# Patient Record
Sex: FEMALE | Race: BLACK OR AFRICAN AMERICAN | NOT HISPANIC OR LATINO
[De-identification: names, ages, dates, MRNs, and addresses within clinical notes are randomized per-mention and may not be internally consistent; named-entity substitution may affect disease eponyms.]

---

## 2018-12-05 ENCOUNTER — APPOINTMENT (OUTPATIENT)
Dept: RADIOLOGY | Facility: CLINIC | Age: 48
End: 2018-12-05
Payer: COMMERCIAL

## 2018-12-05 ENCOUNTER — OUTPATIENT (OUTPATIENT)
Dept: OUTPATIENT SERVICES | Facility: HOSPITAL | Age: 48
LOS: 1 days | End: 2018-12-05
Payer: COMMERCIAL

## 2018-12-05 DIAGNOSIS — Z00.8 ENCOUNTER FOR OTHER GENERAL EXAMINATION: ICD-10-CM

## 2018-12-05 PROCEDURE — 71046 X-RAY EXAM CHEST 2 VIEWS: CPT | Mod: 26

## 2018-12-05 PROCEDURE — 71046 X-RAY EXAM CHEST 2 VIEWS: CPT

## 2022-01-21 ENCOUNTER — APPOINTMENT (OUTPATIENT)
Dept: CT IMAGING | Facility: IMAGING CENTER | Age: 52
End: 2022-01-21

## 2023-11-06 ENCOUNTER — OFFICE (OUTPATIENT)
Facility: LOCATION | Age: 53
Setting detail: OPHTHALMOLOGY
End: 2023-11-06
Payer: COMMERCIAL

## 2023-11-06 DIAGNOSIS — H40.013: ICD-10-CM

## 2023-11-06 DIAGNOSIS — E11.9: ICD-10-CM

## 2023-11-06 DIAGNOSIS — H25.13: ICD-10-CM

## 2023-11-06 PROCEDURE — 92014 COMPRE OPH EXAM EST PT 1/>: CPT | Performed by: OPHTHALMOLOGY

## 2023-11-06 PROCEDURE — 92250 FUNDUS PHOTOGRAPHY W/I&R: CPT | Performed by: OPHTHALMOLOGY

## 2023-11-06 ASSESSMENT — REFRACTION_AUTOREFRACTION
OD_SPHERE: +0.25
OD_CYLINDER: -0.75
OS_CYLINDER: -0.50
OS_AXIS: 081
OS_SPHERE: +0.25
OD_AXIS: 082

## 2023-11-06 ASSESSMENT — SPHEQUIV_DERIVED
OD_SPHEQUIV: -0.125
OS_SPHEQUIV: 0

## 2023-11-06 ASSESSMENT — CONFRONTATIONAL VISUAL FIELD TEST (CVF)
OD_FINDINGS: FULL
OS_FINDINGS: FULL

## 2023-12-12 ENCOUNTER — OFFICE (OUTPATIENT)
Facility: LOCATION | Age: 53
Setting detail: OPHTHALMOLOGY
End: 2023-12-12
Payer: COMMERCIAL

## 2023-12-12 DIAGNOSIS — H40.013: ICD-10-CM

## 2023-12-12 DIAGNOSIS — E11.9: ICD-10-CM

## 2023-12-12 DIAGNOSIS — H25.13: ICD-10-CM

## 2023-12-12 PROCEDURE — 76514 ECHO EXAM OF EYE THICKNESS: CPT | Performed by: OPHTHALMOLOGY

## 2023-12-12 PROCEDURE — 92133 CPTRZD OPH DX IMG PST SGM ON: CPT | Performed by: OPHTHALMOLOGY

## 2023-12-12 PROCEDURE — 99213 OFFICE O/P EST LOW 20 MIN: CPT | Performed by: OPHTHALMOLOGY

## 2023-12-12 PROCEDURE — 92083 EXTENDED VISUAL FIELD XM: CPT | Performed by: OPHTHALMOLOGY

## 2023-12-12 ASSESSMENT — REFRACTION_AUTOREFRACTION
OD_AXIS: 098
OS_SPHERE: +0.50
OS_AXIS: 081
OD_SPHERE: +0.50
OS_CYLINDER: -0.75
OD_CYLINDER: -0.50

## 2023-12-12 ASSESSMENT — SPHEQUIV_DERIVED
OD_SPHEQUIV: 0.25
OS_SPHEQUIV: 0.125

## 2023-12-12 ASSESSMENT — CONFRONTATIONAL VISUAL FIELD TEST (CVF)
OD_FINDINGS: FULL
OS_FINDINGS: FULL

## 2025-06-16 ENCOUNTER — APPOINTMENT (OUTPATIENT)
Dept: SURGERY | Facility: CLINIC | Age: 55
End: 2025-06-16

## 2025-07-08 ENCOUNTER — APPOINTMENT (OUTPATIENT)
Dept: ORTHOPEDIC SURGERY | Facility: CLINIC | Age: 55
End: 2025-07-08
Payer: COMMERCIAL

## 2025-07-08 VITALS — HEIGHT: 69 IN | BODY MASS INDEX: 25.92 KG/M2 | WEIGHT: 175 LBS

## 2025-07-08 PROBLEM — E11.9 DIABETES: Status: RESOLVED | Noted: 2025-07-08 | Resolved: 2025-07-08

## 2025-07-08 PROBLEM — M17.12 ARTHRITIS OF KNEE, LEFT: Status: ACTIVE | Noted: 2025-07-08

## 2025-07-08 PROBLEM — Z96.651 STATUS POST RIGHT KNEE REPLACEMENT: Status: ACTIVE | Noted: 2025-07-08

## 2025-07-08 PROCEDURE — 73562 X-RAY EXAM OF KNEE 3: CPT | Mod: LT

## 2025-07-08 PROCEDURE — 99204 OFFICE O/P NEW MOD 45 MIN: CPT

## 2025-07-08 RX ORDER — MELOXICAM 15 MG/1
15 TABLET ORAL
Qty: 30 | Refills: 1 | Status: ACTIVE | COMMUNITY
Start: 2025-07-08 | End: 1900-01-01

## 2025-07-09 ENCOUNTER — EMERGENCY (EMERGENCY)
Facility: HOSPITAL | Age: 55
LOS: 0 days | Discharge: HOME | End: 2025-07-09
Payer: COMMERCIAL

## 2025-07-09 VITALS
DIASTOLIC BLOOD PRESSURE: 71 MMHG | HEIGHT: 69 IN | OXYGEN SATURATION: 100 % | TEMPERATURE: 98 F | SYSTOLIC BLOOD PRESSURE: 125 MMHG | WEIGHT: 175.05 LBS | RESPIRATION RATE: 18 BRPM | HEART RATE: 89 BPM

## 2025-07-09 VITALS
SYSTOLIC BLOOD PRESSURE: 113 MMHG | OXYGEN SATURATION: 99 % | RESPIRATION RATE: 17 BRPM | HEART RATE: 66 BPM | TEMPERATURE: 98 F | DIASTOLIC BLOOD PRESSURE: 72 MMHG

## 2025-07-09 DIAGNOSIS — M25.562 PAIN IN LEFT KNEE: ICD-10-CM

## 2025-07-09 DIAGNOSIS — G89.29 OTHER CHRONIC PAIN: ICD-10-CM

## 2025-07-09 DIAGNOSIS — E11.9 TYPE 2 DIABETES MELLITUS WITHOUT COMPLICATIONS: ICD-10-CM

## 2025-07-09 DIAGNOSIS — M25.561 PAIN IN RIGHT KNEE: ICD-10-CM

## 2025-07-09 DIAGNOSIS — Z96.651 PRESENCE OF RIGHT ARTIFICIAL KNEE JOINT: ICD-10-CM

## 2025-07-09 PROCEDURE — 99284 EMERGENCY DEPT VISIT MOD MDM: CPT

## 2025-07-09 RX ORDER — ACETAMINOPHEN 500 MG/5ML
650 LIQUID (ML) ORAL ONCE
Refills: 0 | Status: COMPLETED | OUTPATIENT
Start: 2025-07-09 | End: 2025-07-09

## 2025-07-09 RX ORDER — KETOROLAC TROMETHAMINE 30 MG/ML
30 INJECTION, SOLUTION INTRAMUSCULAR; INTRAVENOUS ONCE
Refills: 0 | Status: DISCONTINUED | OUTPATIENT
Start: 2025-07-09 | End: 2025-07-09

## 2025-07-09 RX ADMIN — Medication 650 MILLIGRAM(S): at 17:47

## 2025-07-09 RX ADMIN — KETOROLAC TROMETHAMINE 30 MILLIGRAM(S): 30 INJECTION, SOLUTION INTRAMUSCULAR; INTRAVENOUS at 17:47

## 2025-07-09 RX ADMIN — KETOROLAC TROMETHAMINE 30 MILLIGRAM(S): 30 INJECTION, SOLUTION INTRAMUSCULAR; INTRAVENOUS at 15:08

## 2025-07-09 RX ADMIN — Medication 650 MILLIGRAM(S): at 15:08

## 2025-07-09 NOTE — ED PROVIDER NOTE - CLINICAL SUMMARY MEDICAL DECISION MAKING FREE TEXT BOX
55F w/ PMH DM2, Chronic Knee Pain, s/p RT TKR who presents to ED w/ bilateral knee pain x months. Pt reporting history of chronic knee pain, gradually worsening over the past few months, pt states w/ worsening left knee pain s/p transferring a patient at work, pt went to outside clinic for XRAY's which showed Arthritic changes but no acute fractures, pt w/ scheduled Ortho appt tomorrow. Pt presenting to ED today for persistent knee pain. No recent injury/trauma or falls. Denies fever, CP, SOB, abd pain, extremity weakness/numbness/tingling, skin erythema/swelling/warmth, dizziness, or headaches.     On PE - Patient currently afebrile, hemodynamically stable, spO2 100%.   General: Pt awake, alert, well-appearing, in no acute distress.  HEENT: Head atraumatic, normocephalic. Airway patent.  Cardio: Normal rate, regular rhythm.  Heart sounds S1, S2.   Lungs: Chest symmetrical, non-labored breathing, lungs clear bilaterally.  Abdomen: Abdomen soft/non-distended/non-tender to palpation.   Neuro: Alert and oriented, no focal deficits, no motor or sensory deficits.  MSK: Moving all extremities equally. Full ROM of the knees b/l, no obvious skin erythema/swelling/warmth, b/l knees non-tender w/ palpation, able to ambulate w/o restriction.  Spine: Spine appears normal, range of motion is not limited, no muscle or joint tenderness.  Skin: Skin normal color for race, warm, dry and intact. No evidence of rash.    Based on history and physical, differentials include but are not limited to muscle strain/sprain, arthritis. XRAY's not warranted at this time since pt w/ recent XRAY's yesterday. Will administer medications for symptomatic relief, follow-up on results, and reassess. Disposition pending workup/re-evaluation.    Shared Decision Making - Reassessment performed, pt w/ improvement of symptoms w/ medications, able to ambulate w/ cane for assistance,  pt w/ scheduled [facility] Ortho f/u appointment tomorrow. Will DC home w/ meds for symptomatic relief and outpatient PMD/Ortho f/u.

## 2025-07-09 NOTE — ED ADULT NURSE NOTE - OBJECTIVE STATEMENT
56 y/o female present in ED c/o worsening left knee pain x 1 week. Patient denies any recent injuries.

## 2025-07-09 NOTE — ED ADULT NURSE NOTE - NSFALLRISKINTERV_ED_ALL_ED

## 2025-07-09 NOTE — ED PROVIDER NOTE - PROGRESS NOTE DETAILS
FRANCIA Howe: Shared Decision Making - Reassessment performed, pt w/ improvement of symptoms w/ medications, able to ambulate w/ cane for assistance,  pt w/ scheduled [facility] Ortho f/u appointment tomorrow. Patient is medically stable for discharge. Strict return precautions given, discussed red flag signs/symptoms. Patient to follow up with PMD, Ortho. Patient/parent displays understanding and agreeable with plan, comfortable with discharge plan home.

## 2025-07-09 NOTE — ED PROVIDER NOTE - PATIENT PORTAL LINK FT
You can access the FollowMyHealth Patient Portal offered by [facility] by registering at the following website: [email or web address]. By joining [employer]’s FollowMyHealth portal, you will also be able to view your health information using other applications (apps) compatible with our system.

## 2025-07-09 NOTE — ED PROVIDER NOTE - NSFOLLOWUPINSTRUCTIONS_ED_ALL_ED_FT
Follow-up with your Primary Care Physician within the next week.  Follow-up with Orthopedics as discussed.    Medications  - Take Tylenol (Acetaminophen) 650 mg every 4-6 hours AND/OR Motrin (Ibuprofen) 600 mg every 6-8 hours as needed for pain/fever.    Advance activity as tolerated.  Continue all previously prescribed medications as directed unless otherwise instructed.  Follow up with your primary care physician in 48-72 hours- bring copies of your results.  Return to the ER for worsening or persistent symptoms, and/or ANY NEW OR CONCERNING SYMPTOMS such as fever, chest pain, shortness of breath, abdominal pain, or headaches. If you have issues obtaining follow up, please call: [phone] to obtain a doctor or specialist who takes your insurance in your area.  You may call [phone] to make an appointment with the internal medicine clinic.    Knee pain that lasts longer than 3 months is called chronic knee pain. You may have pain in one or both knees. Symptoms of chronic knee pain may also include swelling and stiffness. The most common cause is age-related wear and tear (osteoarthritis) of your knee joint. Many conditions can cause chronic knee pain.    Treatment depends on the cause. The main treatments are physical therapy and weight loss. It may also be treated with medicines, injections, a knee sleeve or brace, and by using crutches. Rest, ice, compression (pressure), and elevation (RICE) therapy may also be recommended.    Follow these instructions at home:      If you have a knee sleeve or brace:     Wear it as told by your doctor. Remove it only as told by your doctor.  Loosen it if your toes:    Tingle.  Become numb.  Turn cold and blue.  Keep it clean.  If the sleeve or brace is not waterproof:    Do not let it get wet.  Remove it if told by your doctor, or cover it with a watertight covering when you take a bath or shower.        Managing pain, stiffness, and swelling      If told, put heat on your knee. Do this as often as told by your doctor. Use the heat source that your doctor recommends, such as a moist heat pack or a heating pad.    If you have a removable sleeve or brace, remove it as told by your doctor.  Place a towel between your skin and the heat source.  Leave the heat on for 20–30 minutes.  Remove the heat if your skin turns bright red. This is very important if you are unable to feel pain, heat, or cold. You may have a greater risk of getting burned.  If told, put ice on your knee.    If you have a removable sleeve or brace, remove it as told by your doctor.  Put ice in a plastic bag.  Place a towel between your skin and the bag.  Leave the ice on for 20 minutes, 2–3 times a day.  Move your toes often.  Raise (elevate) the injured area above the level of your heart while you are sitting or lying down.        Activity    Avoid activities where both feet leave the ground at the same time (high-impact activities). Examples are running, jumping rope, and doing jumping jacks.  Return to your normal activities as told by your doctor. Ask your doctor what activities are safe for you.  Follow the exercise plan that your doctor makes for you. Your doctor may suggest that you:    Avoid activities that make knee pain worse. You may need to change the exercises that you do, the sports that you participate in, or your job duties.  Wear shoes with cushioned soles.  Avoid high-impact activities or sports that require running and sudden changes in direction.  Do exercises or physical therapy as told by your doctor. Physical therapy is planned to match your needs and abilities.  Do exercises that increase your balance and strength, such as brenda chi and yoga.  Do not use your injured knee to support your body weight until your doctor says that you can. Use crutches, a cane, or a walker, as told by your doctor.        General instructions    Take over-the-counter and prescription medicines only as told by your doctor.  If you are overweight, work with your doctor and a food expert (dietitian) to set goals to lose weight. Being overweight can make your knee hurt more.  Do not use any products that contain nicotine or tobacco, such as cigarettes, e-cigarettes, and chewing tobacco. If you need help quitting, ask your doctor.  Keep all follow-up visits as told by your doctor. This is important.    Contact a doctor if:  You have knee pain that is not getting better or gets worse.  You are not able to do your exercises due to knee pain.    Get help right away if:  Your knee swells and the swelling becomes worse.  You cannot move your knee.  You have very bad knee pain.    Summary  Knee pain that lasts more than 3 months is considered chronic knee pain.  The main treatments for chronic knee pain are physical therapy and weight loss. You may also need to take medicines, wear a knee sleeve or brace, use crutches, and put ice or heat on your knee.  Lose weight if you are overweight. Work with your doctor and a food expert (dietitian) to help you set goals to lose weight. Being overweight can make your knee hurt more.  Work with a physical therapist to make a safe exercise program, as told by your doctor.    ADDITIONAL NOTES AND INSTRUCTIONS    Please follow up with your Primary MD in 24-48 hr.  Seek immediate medical care for any new/worsening signs or symptoms.

## 2025-07-09 NOTE — ED ADULT NURSE NOTE - CHIEF COMPLAINT QUOTE
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 Unable to bear weight or ambulate. PMH DM, R knee replacement

## 2025-07-28 ENCOUNTER — APPOINTMENT (OUTPATIENT)
Dept: ORTHOPEDIC SURGERY | Facility: CLINIC | Age: 55
End: 2025-07-28